# Patient Record
Sex: FEMALE | Race: OTHER | URBAN - METROPOLITAN AREA
[De-identification: names, ages, dates, MRNs, and addresses within clinical notes are randomized per-mention and may not be internally consistent; named-entity substitution may affect disease eponyms.]

---

## 2017-03-11 ENCOUNTER — EMERGENCY (EMERGENCY)
Facility: HOSPITAL | Age: 27
LOS: 1 days | Discharge: HOME | End: 2017-03-11
Attending: EMERGENCY MEDICINE
Payer: MEDICAID

## 2017-03-11 VITALS
DIASTOLIC BLOOD PRESSURE: 69 MMHG | RESPIRATION RATE: 20 BRPM | SYSTOLIC BLOOD PRESSURE: 100 MMHG | TEMPERATURE: 98 F | OXYGEN SATURATION: 99 % | HEIGHT: 61 IN | HEART RATE: 69 BPM | WEIGHT: 136.91 LBS

## 2017-03-11 DIAGNOSIS — F41.9 ANXIETY DISORDER, UNSPECIFIED: ICD-10-CM

## 2017-03-11 DIAGNOSIS — R59.1 GENERALIZED ENLARGED LYMPH NODES: ICD-10-CM

## 2017-03-11 PROCEDURE — 99284 EMERGENCY DEPT VISIT MOD MDM: CPT | Mod: 25

## 2017-03-11 PROCEDURE — 99283 EMERGENCY DEPT VISIT LOW MDM: CPT

## 2017-03-11 RX ORDER — AMOXICILLIN 250 MG/5ML
1 SUSPENSION, RECONSTITUTED, ORAL (ML) ORAL
Qty: 20 | Refills: 0 | OUTPATIENT
Start: 2017-03-11 | End: 2017-03-21

## 2017-03-11 NOTE — ED PROVIDER NOTE - PHYSICAL EXAMINATION
LYMPH: 1 tender posterior auricular, firm and tender to touch. LYMPH: 1 tender posterior auricular lymph node 0.5cm, firm and tender to touch.

## 2017-03-11 NOTE — ED PROVIDER NOTE - NS ED MD SCRIBE ATTENDING SCRIBE SECTIONS
PAST MEDICAL/SURGICAL/SOCIAL HISTORY/HIV/HISTORY OF PRESENT ILLNESS/REVIEW OF SYSTEMS/DISPOSITION/PHYSICAL EXAM/VITAL SIGNS( Pullset)

## 2017-03-11 NOTE — ED ADULT NURSE NOTE - OBJECTIVE STATEMENT
As per patient she feels a bump behind her l ear for 3 days with pain d no fever on arrival denies any discharges or hearing problem

## 2017-03-11 NOTE — ED PROVIDER NOTE - OBJECTIVE STATEMENT
25 y/o female with no PMHx presents to the ED c/o L ear bump x 3 days. Pt notes associated pain around area with mild HA. Pt states that lump has not changed in size since it started. Pt relates that she recently  her hair 4 days ago. Pt has been taking Motrin x 2 doses to no relief in Sx. Pt denies fever, chills, teeth pain, throat pain, cough, or any other complaints. NKDA. 27 y/o female with no PMHx presents to the ED c/o bump behind left ear x 3 days. Pt notes associated pain around area with mild HA. Pt states that lump has not changed in size since it started. Pt relates that she recently dyed her hair 4 days ago. Pt has been taking Motrin x 2 doses to no relief in Sx. Pt denies fever, chills, teeth pain, throat pain, cough, or any other complaints. NKDA.

## 2017-03-11 NOTE — ED PROVIDER NOTE - MEDICAL DECISION MAKING DETAILS
patient with painful lymph node. mild left ear effusion. no evidence of infection. home with amoxicillin with instructions to take them if develops ear pain.

## 2018-07-20 ENCOUNTER — EMERGENCY (EMERGENCY)
Facility: HOSPITAL | Age: 28
LOS: 1 days | Discharge: HOME | End: 2018-07-20
Attending: EMERGENCY MEDICINE | Admitting: EMERGENCY MEDICINE
Payer: SELF-PAY

## 2018-07-20 VITALS
OXYGEN SATURATION: 100 % | RESPIRATION RATE: 16 BRPM | DIASTOLIC BLOOD PRESSURE: 64 MMHG | HEART RATE: 75 BPM | TEMPERATURE: 97 F | SYSTOLIC BLOOD PRESSURE: 100 MMHG

## 2018-07-20 VITALS
HEART RATE: 85 BPM | SYSTOLIC BLOOD PRESSURE: 104 MMHG | OXYGEN SATURATION: 100 % | RESPIRATION RATE: 16 BRPM | DIASTOLIC BLOOD PRESSURE: 70 MMHG | TEMPERATURE: 98 F | WEIGHT: 132.28 LBS

## 2018-07-20 PROCEDURE — 99282 EMERGENCY DEPT VISIT SF MDM: CPT

## 2018-07-20 NOTE — ED PROVIDER NOTE - MEDICAL DECISION MAKING DETAILS
pt with multiple fbs in sole of foot from sea urchin, big pieces removed, no evidence of infection, pt counseled on warm soaks, hydrocortisone, neosporin, will return with signs of infection

## 2018-07-20 NOTE — ED PROVIDER NOTE - OBJECTIVE STATEMENT
26 y/o f c/o right foot pain s/p stepping on sea urchin 10 days prior.  Pt states she initially had most fbs removed by doctor in [place].  Continues to complain of pain with walking.  Denies fever, chills, redness, drainage around area.

## 2018-07-20 NOTE — ED ADULT NURSE NOTE - OBJECTIVE STATEMENT
Patient presents to ED with c/o right foot pain. Patient reports stepping on a sea urchin 1 week ago. Reports fever x1 days. No redness or swelling noted to right foot. Ambulating with steady gait. Awaiting to be seen.

## 2018-07-20 NOTE — ED PROVIDER NOTE - MUSCULOSKELETAL, MLM
right foot - plantar aspect, multiple (+) fbs in sole of foot, fourth toe, no surrounding erythema or drainage or evidence of infection, pulses intact

## 2018-07-20 NOTE — ED ADULT NURSE NOTE - DISCHARGE TEACHING
followup with primary. Return to ED if symptoms worsen or develop any signs/symptoms infection (redness, swelling, discharge, fever or chills)

## 2018-07-24 DIAGNOSIS — S90.851A SUPERFICIAL FOREIGN BODY, RIGHT FOOT, INITIAL ENCOUNTER: ICD-10-CM
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
## 2020-03-05 ENCOUNTER — EMERGENCY (EMERGENCY)
Facility: HOSPITAL | Age: 30
LOS: 1 days | Discharge: HOME | End: 2020-03-05
Attending: EMERGENCY MEDICINE | Admitting: EMERGENCY MEDICINE
Payer: COMMERCIAL

## 2020-03-05 VITALS
HEART RATE: 66 BPM | OXYGEN SATURATION: 100 % | SYSTOLIC BLOOD PRESSURE: 112 MMHG | WEIGHT: 125 LBS | RESPIRATION RATE: 16 BRPM | DIASTOLIC BLOOD PRESSURE: 77 MMHG | TEMPERATURE: 98 F | HEIGHT: 61 IN

## 2020-03-05 PROCEDURE — 71046 X-RAY EXAM CHEST 2 VIEWS: CPT | Mod: 26

## 2020-03-05 PROCEDURE — 93010 ELECTROCARDIOGRAM REPORT: CPT

## 2020-03-05 PROCEDURE — 99285 EMERGENCY DEPT VISIT HI MDM: CPT | Mod: 25

## 2020-03-05 RX ORDER — KETOROLAC TROMETHAMINE 30 MG/ML
30 SYRINGE (ML) INJECTION ONCE
Refills: 0 | Status: DISCONTINUED | OUTPATIENT
Start: 2020-03-05 | End: 2020-03-05

## 2020-03-05 NOTE — ED ADULT NURSE NOTE - OBJECTIVE STATEMENT
pt a&ox3 resting in stretcher. pt sister at bedside. pt reports ear and sore throat x1 day. today, pt woke up with constant midsternal chest pain that she describes as aching and feeling similar to "when you don't chew your food all the way and it gets stuck in your chest until you feel it pass." pt reports increased pain on deep inspiration and when she sits up. pt has not taken any medication for pain. pt denies n,v,d, fevers, sob. reports strep throat diagnosis 3 weeks ago and completed amoxicillin abt x10 days. pt a&ox3 resting in stretcher. pt sister at bedside. pt reports ear and sore throat x1 day. today, pt woke up with constant midsternal chest pain that she describes as aching and feeling similar to "when you don't chew your food all the way and it gets stuck in your chest until you feel it pass." pt reports increased pain on deep inspiration and when she sits up. redness noted to throat. pt has not taken any medication for pain. pt denies n,v,d, fevers, sob. reports strep throat diagnosis 3 weeks ago and completed amoxicillin abt x10 days.

## 2020-03-05 NOTE — ED ADULT TRIAGE NOTE - CHIEF COMPLAINT QUOTE
Presents to ed for midsternal chest discomfort x 1 day.  Reports pain worsens when she takes a deep breath and attempts to lay down.

## 2020-03-06 VITALS
OXYGEN SATURATION: 99 % | SYSTOLIC BLOOD PRESSURE: 103 MMHG | RESPIRATION RATE: 18 BRPM | DIASTOLIC BLOOD PRESSURE: 53 MMHG | HEART RATE: 65 BPM | TEMPERATURE: 98 F

## 2020-03-06 DIAGNOSIS — Z90.49 ACQUIRED ABSENCE OF OTHER SPECIFIED PARTS OF DIGESTIVE TRACT: Chronic | ICD-10-CM

## 2020-03-06 LAB
ALBUMIN SERPL ELPH-MCNC: 4.4 G/DL — SIGNIFICANT CHANGE UP (ref 3.3–5)
ALP SERPL-CCNC: 58 U/L — SIGNIFICANT CHANGE UP (ref 40–120)
ALT FLD-CCNC: 13 U/L — SIGNIFICANT CHANGE UP (ref 10–45)
ANION GAP SERPL CALC-SCNC: 15 MMOL/L — SIGNIFICANT CHANGE UP (ref 5–17)
APTT BLD: 31.7 SEC — SIGNIFICANT CHANGE UP (ref 27.5–36.3)
AST SERPL-CCNC: 16 U/L — SIGNIFICANT CHANGE UP (ref 10–40)
BASOPHILS # BLD AUTO: 0.02 K/UL — SIGNIFICANT CHANGE UP (ref 0–0.2)
BASOPHILS NFR BLD AUTO: 0.3 % — SIGNIFICANT CHANGE UP (ref 0–2)
BILIRUB SERPL-MCNC: 0.8 MG/DL — SIGNIFICANT CHANGE UP (ref 0.2–1.2)
BUN SERPL-MCNC: 14 MG/DL — SIGNIFICANT CHANGE UP (ref 7–23)
CALCIUM SERPL-MCNC: 9.6 MG/DL — SIGNIFICANT CHANGE UP (ref 8.4–10.5)
CHLORIDE SERPL-SCNC: 105 MMOL/L — SIGNIFICANT CHANGE UP (ref 96–108)
CO2 SERPL-SCNC: 20 MMOL/L — LOW (ref 22–31)
CREAT SERPL-MCNC: 0.73 MG/DL — SIGNIFICANT CHANGE UP (ref 0.5–1.3)
D DIMER BLD IA.RAPID-MCNC: <150 NG/ML DDU — SIGNIFICANT CHANGE UP
EOSINOPHIL # BLD AUTO: 0.08 K/UL — SIGNIFICANT CHANGE UP (ref 0–0.5)
EOSINOPHIL NFR BLD AUTO: 1.1 % — SIGNIFICANT CHANGE UP (ref 0–6)
GLUCOSE SERPL-MCNC: 92 MG/DL — SIGNIFICANT CHANGE UP (ref 70–99)
HCT VFR BLD CALC: 41.9 % — SIGNIFICANT CHANGE UP (ref 34.5–45)
HGB BLD-MCNC: 14.6 G/DL — SIGNIFICANT CHANGE UP (ref 11.5–15.5)
IMM GRANULOCYTES NFR BLD AUTO: 0.3 % — SIGNIFICANT CHANGE UP (ref 0–1.5)
INR BLD: 1.03 — SIGNIFICANT CHANGE UP (ref 0.88–1.16)
LYMPHOCYTES # BLD AUTO: 2.13 K/UL — SIGNIFICANT CHANGE UP (ref 1–3.3)
LYMPHOCYTES # BLD AUTO: 30 % — SIGNIFICANT CHANGE UP (ref 13–44)
MCHC RBC-ENTMCNC: 31.9 PG — SIGNIFICANT CHANGE UP (ref 27–34)
MCHC RBC-ENTMCNC: 34.8 GM/DL — SIGNIFICANT CHANGE UP (ref 32–36)
MCV RBC AUTO: 91.5 FL — SIGNIFICANT CHANGE UP (ref 80–100)
MONOCYTES # BLD AUTO: 0.63 K/UL — SIGNIFICANT CHANGE UP (ref 0–0.9)
MONOCYTES NFR BLD AUTO: 8.9 % — SIGNIFICANT CHANGE UP (ref 2–14)
NEUTROPHILS # BLD AUTO: 4.21 K/UL — SIGNIFICANT CHANGE UP (ref 1.8–7.4)
NEUTROPHILS NFR BLD AUTO: 59.4 % — SIGNIFICANT CHANGE UP (ref 43–77)
NRBC # BLD: 0 /100 WBCS — SIGNIFICANT CHANGE UP (ref 0–0)
PLATELET # BLD AUTO: 231 K/UL — SIGNIFICANT CHANGE UP (ref 150–400)
POTASSIUM SERPL-MCNC: 3.2 MMOL/L — LOW (ref 3.5–5.3)
POTASSIUM SERPL-SCNC: 3.2 MMOL/L — LOW (ref 3.5–5.3)
PROT SERPL-MCNC: 7.7 G/DL — SIGNIFICANT CHANGE UP (ref 6–8.3)
PROTHROM AB SERPL-ACNC: 11.8 SEC — SIGNIFICANT CHANGE UP (ref 10–12.9)
RBC # BLD: 4.58 M/UL — SIGNIFICANT CHANGE UP (ref 3.8–5.2)
RBC # FLD: 11.9 % — SIGNIFICANT CHANGE UP (ref 10.3–14.5)
S PYO AG SPEC QL IA: NEGATIVE — SIGNIFICANT CHANGE UP
SODIUM SERPL-SCNC: 140 MMOL/L — SIGNIFICANT CHANGE UP (ref 135–145)
TROPONIN T SERPL-MCNC: <0.01 NG/ML — SIGNIFICANT CHANGE UP (ref 0–0.01)
WBC # BLD: 7.09 K/UL — SIGNIFICANT CHANGE UP (ref 3.8–10.5)
WBC # FLD AUTO: 7.09 K/UL — SIGNIFICANT CHANGE UP (ref 3.8–10.5)

## 2020-03-06 PROCEDURE — 85025 COMPLETE CBC W/AUTO DIFF WBC: CPT

## 2020-03-06 PROCEDURE — 84484 ASSAY OF TROPONIN QUANT: CPT

## 2020-03-06 PROCEDURE — 87880 STREP A ASSAY W/OPTIC: CPT

## 2020-03-06 PROCEDURE — 71046 X-RAY EXAM CHEST 2 VIEWS: CPT

## 2020-03-06 PROCEDURE — 85610 PROTHROMBIN TIME: CPT

## 2020-03-06 PROCEDURE — 93005 ELECTROCARDIOGRAM TRACING: CPT

## 2020-03-06 PROCEDURE — 87081 CULTURE SCREEN ONLY: CPT

## 2020-03-06 PROCEDURE — 71046 X-RAY EXAM CHEST 2 VIEWS: CPT | Mod: 26

## 2020-03-06 PROCEDURE — 80053 COMPREHEN METABOLIC PANEL: CPT

## 2020-03-06 PROCEDURE — 85730 THROMBOPLASTIN TIME PARTIAL: CPT

## 2020-03-06 PROCEDURE — 36415 COLL VENOUS BLD VENIPUNCTURE: CPT

## 2020-03-06 PROCEDURE — 85379 FIBRIN DEGRADATION QUANT: CPT

## 2020-03-06 PROCEDURE — 96374 THER/PROPH/DIAG INJ IV PUSH: CPT

## 2020-03-06 PROCEDURE — 99284 EMERGENCY DEPT VISIT MOD MDM: CPT | Mod: 25

## 2020-03-06 RX ORDER — POTASSIUM CHLORIDE 20 MEQ
40 PACKET (EA) ORAL ONCE
Refills: 0 | Status: COMPLETED | OUTPATIENT
Start: 2020-03-06 | End: 2020-03-06

## 2020-03-06 RX ORDER — DEXAMETHASONE 0.5 MG/5ML
10 ELIXIR ORAL ONCE
Refills: 0 | Status: COMPLETED | OUTPATIENT
Start: 2020-03-06 | End: 2020-03-06

## 2020-03-06 RX ADMIN — Medication 30 MILLIGRAM(S): at 00:08

## 2020-03-06 RX ADMIN — Medication 10 MILLIGRAM(S): at 01:47

## 2020-03-06 RX ADMIN — Medication 30 MILLIGRAM(S): at 00:45

## 2020-03-06 RX ADMIN — Medication 40 MILLIEQUIVALENT(S): at 01:36

## 2020-03-06 NOTE — ED ADULT NURSE REASSESSMENT NOTE - NS ED NURSE REASSESS COMMENT FT1
pt. ambulated to xr and back to bed without incident, steady gait noted, family member remains at side

## 2020-03-06 NOTE — ED PROVIDER NOTE - PHYSICAL EXAMINATION
CONSTITUTIONAL: WD,WN. NAD.    SKIN: Normal color and turgor. No rash.    HEAD: NC/AT.  EYES: Conjunctiva clear. EOMI. PERRL.    ENT: Airway patent, OP without erythema, tonsillar swelling or exudate; uvula midline without swelling. Nasal mucosa clear, no rhinorrhea.   RESPIRATORY:  Breathing non-labored. No retractions or accessory muscle use.  Lungs CTA bilat.  CARDIOVASCULAR:  RRR, S1S2. No M/R/G.      GI:  Abdomen soft, nontender.    MSK: Neck supple with painless ROM.  No lower extremity edema or calf  tenderness.  No joint swelling or ROM limitation. Exquisite tenderness to palpation of sternal region of chest wall.   NEURO: Alert and oriented; CN II-XII grossly intact. Speech clear. 5/5 strength in all extremities.  Normal balance and gait.

## 2020-03-06 NOTE — ED PROVIDER NOTE - OBJECTIVE STATEMENT
30 y/o F pt with no pertinent PMHx and PSHx presents to ED c/o chest pain since this morning, and sore throat & bilateral ear fullness since yesterday. Per pt, she's had constant chest pain in the center of her chest that is worse when she takes a deep breath and with movement of her arms. Pt relates completing a 10 day course of Amoxicillin for strep throat 3 weeks ago. Pt denies trauma, fevers, chills, cough, nausea, vomiting, associated palpitations, shortness of breath, hemoptysis, diaphoresis, life time hx of syncope, or any other acute complaints.     Pt does not take any meds, has no known allergies, no hx of smoking, vaping, cocaine use, or other drug use.   No FHx of premature CAD or sudden death in immediate family members.  PE Risk factors: no hx of DVT or PE, no hx of malignancy, estrogen use, hemoptysis, recent immobilization, or bed rest.   Cardiac risk factors: No hx of heart disease, obesity, DM, HTN, or HLD.

## 2020-03-06 NOTE — ED PROVIDER NOTE - NSFOLLOWUPINSTRUCTIONS_ED_ALL_ED_FT
You can use ibuprofen (600 mg every 6-8 hours) if needed for pain.   Please follow up with your doctor tomorrow.  Return to the Emergency Department if you have any new or worsening symptoms, or if you have any concerns.  ==================================================  COSTOCHONDRITIS - AfterCare(R) Instructions(ER/ED)     Costochondritis    WHAT YOU NEED TO KNOW:    Costochondritis is a condition that causes pain in the cartilage that connect your ribs to your sternum (breastbone). Cartilage is the tough, bendable tissue that protects your bones.     DISCHARGE INSTRUCTIONS:    Medicines:     Acetaminophen: This medicine decreases pain. Acetaminophen is available without a doctor's order. Ask how much to take and how often to take it. Follow directions. Acetaminophen can cause liver damage if not taken correctly.      NSAIDs, such as ibuprofen, help decrease swelling, pain, and fever. This medicine is available with or without a doctor's order. NSAIDs can cause stomach bleeding or kidney problems in certain people. If you take blood thinner medicine, always ask if NSAIDs are safe for you. Always read the medicine label and follow directions. Do not give these medicines to children under 6 months of age without direction from your child's healthcare provider.      Take your medicine as directed. Contact your healthcare provider if you think your medicine is not helping or if you have side effects. Tell him of her if you are allergic to any medicine. Keep a list of the medicines, vitamins, and herbs you take. Include the amounts, and when and why you take them. Bring the list or the pill bottles to follow-up visits. Carry your medicine list with you in case of an emergency.    Follow up with your healthcare provider as directed: Write down your questions so you remember to ask them during your visits.     Rest: You may need to get more rest. Learn which movements and activities cause pain, and avoid doing them. Do not carry objects, such as a purse or backpack, if this is painful. Avoid activities such as rowing and weightlifting until your pain decreases or goes away. Ask which activities are best for you to do while you recover.    Heat: Heat helps decrease pain in some patients. Apply heat on the area for 20 to 30 minutes every 2 hours for as many days as directed.     Ice: Ice helps decrease swelling and pain. Ice may also help prevent tissue damage. Use an ice pack, or put crushed ice in a plastic bag. Cover it with a towel and place it on the painful area for 15 to 20 minutes every hour or as directed.    Stretching exercises: Gentle stretching may help your symptoms.  a doorway and put your hands on the door frame at the level of your ears or shoulders. Take 1 step forward and gently stretch your chest. Try this with your hands higher up on the doorway.     Contact your healthcare provider if:     You have a fever.      The painful areas of your chest look swollen, red, and feel warm to the touch.       You cannot sleep because of the pain.      You have questions or concerns about your condition or care.

## 2020-03-06 NOTE — ED PROVIDER NOTE - CLINICAL SUMMARY MEDICAL DECISION MAKING FREE TEXT BOX
Very suspicious for chest wall pain, very tender parasternal region ie costochondritis.  Consider GERD, though less likely.   Suspect concurrent mild viral URI/pharyngitis.  Low cardiac risk profile and low clinical susp for ACS.  Normal EKG, trop neg.   Wells PE 0 and PERC negative.  d-dimer neg.  Normal CXR.

## 2020-03-06 NOTE — ED PROVIDER NOTE - PATIENT PORTAL LINK FT
You can access the FollowMyHealth Patient Portal offered by [facility] by registering at the following website: [email or web address]. By joining [facility]’s FollowMyHealth portal, you will also be able to view your health information using other applications (apps) compatible with our system.

## 2020-03-06 NOTE — ED PROVIDER NOTE - CHPI ED SYMPTOMS NEG
no fever/no palpitations, no hemoptysis/no chills/no nausea/no cough/no diaphoresis/no shortness of breath/no vomiting

## 2020-03-09 DIAGNOSIS — R07.89 OTHER CHEST PAIN: ICD-10-CM

## 2020-03-09 DIAGNOSIS — J02.9 ACUTE PHARYNGITIS, UNSPECIFIED: ICD-10-CM

## 2020-03-09 DIAGNOSIS — H93.93 UNSPECIFIED DISORDER OF EAR, BILATERAL: ICD-10-CM

## 2021-06-17 ENCOUNTER — RESULT REVIEW (OUTPATIENT)
Age: 31
End: 2021-06-17

## 2023-05-18 NOTE — ED ADULT TRIAGE NOTE - HEIGHT IN FEET
You may receive a brief 10 question survey after the completion of today's visit, please complete this survey to let us know where we are doing well and meeting your needs as our patient, but also any areas of improvement! Thank you!    Goals for Weight Loss:    Decrease Sugars and Sweets:  Limit carbohydrates to 15 grams per meal.  Avoid artificial sweeteners, as they can slow weight loss.      Your body can only process 12 g of carbohydrate at a time, and the rest is stored, making you gain weight.  Eat more protein - meats, eggs, cheese - to stay full. Remember, fruits and vegetables can be high in carbohydrates  Read the nutrition labels on food and pay attention to portion sizes.      Weight loss:  4-8 pounds in 1 month.    Goal BMI is <30, yours is currently Body mass index is 36.4 kg/m².    Drink plenty of water  Eat more protein - meats, seafood, cheese, eggs  Eating fats are fine - oils, whipped cream, compa cheese dressing  Fruits and Vegetables can be high in carbohydrates  NO juices, sweet teas, NO ARTIFICIAL SWEETENERS  NO starches - breads, pasta, rice, pizza, potatoes  “Low fat” products usually are high in carbohydrates - watch those labels  READ NUTRITION LABELS  Google EVERYTHING you eat:  “how many grams of carbs in _____”  Add broth daily    Keep a journal:  Write down your foods and beverages before you eat.  Make sure to add in your exercise as above. May use the Calorie Mario Alberto book for carbohydrate counting.      Exercise:   5000 Steps Daily  150 minutes of exercise weekly. Can break this down into 30 minutes 5 days a week, or into smaller segments as tolerated.    Ultimate goal is 10,000 steps daily. Use pedometer to count your steps. If you are not achieving the 10,000 steps today, increase your steps gradually so that you can achieve that goal. Look at your average number of steps over the course of 1 week. Increase your goal by 1000 steps daily for 2 weeks, and continue to do this until you  achieve the 10,000 steps daily goal.  Try to stand rather than sit as much as possible.        Alcohol:  No more than 2 standard drinks per day or 7 standard drinks per week. These are empty calories, with no nutritional benefit. Your body burns alcohol before fat and will slow your weight loss.    Tobacco:  You are currently tobacco free. Keep it up!    Websites:  www.[employer]  http://[employer]/od/atkinsdiet/a/atBlue Vector Systemsfoodlists.htm  www.[employer]    https://recipes.[employer]/great-recipes.asp?food=atkins+induction  Apps for Low Carbohydrate eating:   Diet Logbooks  [employer] Carb Tracker  Carb Manager  Low Carb Diet Assistant   Exercise and Diet  Calorie Counter & Diet Tracker by [employer] Fitness Pal  Glycemic Index:  Low GI Diet  Low GI Diet Tracker  Recipes:   Gogo Carb Tracker   [email or web address]   [email or web address]   [facility]   [email or web address]   *Search for recipes with <15 grams of carbohydrate*  Recipe Builders:  Recipe Builder Pro    Return in about 2 months (around 7/18/2023) for Follow up Diabetes/Weight Management, with non-fasting lab prior, 60 minute modifier.     As a friendly reminder to all our patients, the Endocrinology department respects all our patients' time and we do our best to see you at your scheduled appointment time. Therefore, please arrive 15 minutes prior to your appointment in order to complete check-in and the rooming process. Please also note, if you are more than 5 minutes late for your appointment, we may ask that your appointment be rescheduled.    5